# Patient Record
Sex: FEMALE | Race: WHITE
[De-identification: names, ages, dates, MRNs, and addresses within clinical notes are randomized per-mention and may not be internally consistent; named-entity substitution may affect disease eponyms.]

---

## 2021-06-23 ENCOUNTER — HOSPITAL ENCOUNTER (OUTPATIENT)
Dept: HOSPITAL 53 - M PAIN | Age: 31
End: 2021-06-23
Attending: NURSE PRACTITIONER
Payer: COMMERCIAL

## 2021-06-23 DIAGNOSIS — F17.210: ICD-10-CM

## 2021-06-23 DIAGNOSIS — M41.9: Primary | ICD-10-CM

## 2021-06-23 DIAGNOSIS — F31.9: ICD-10-CM

## 2021-06-23 DIAGNOSIS — Z88.1: ICD-10-CM

## 2021-06-23 DIAGNOSIS — M51.24: ICD-10-CM

## 2021-06-23 DIAGNOSIS — F90.9: ICD-10-CM

## 2021-08-12 ENCOUNTER — HOSPITAL ENCOUNTER (OUTPATIENT)
Dept: HOSPITAL 53 - M PAIN | Age: 31
End: 2021-08-12
Attending: ANESTHESIOLOGY
Payer: COMMERCIAL

## 2021-08-12 DIAGNOSIS — G89.29: Primary | ICD-10-CM

## 2021-08-12 DIAGNOSIS — M41.9: ICD-10-CM

## 2021-08-12 DIAGNOSIS — Z88.8: ICD-10-CM

## 2021-08-12 DIAGNOSIS — M51.24: ICD-10-CM

## 2021-08-12 DIAGNOSIS — Z88.1: ICD-10-CM

## 2021-08-12 DIAGNOSIS — F31.9: ICD-10-CM

## 2021-08-12 DIAGNOSIS — F17.210: ICD-10-CM

## 2021-09-28 ENCOUNTER — HOSPITAL ENCOUNTER (OUTPATIENT)
Dept: HOSPITAL 53 - M PAIN | Age: 31
End: 2021-09-28
Attending: ANESTHESIOLOGY
Payer: COMMERCIAL

## 2021-09-28 DIAGNOSIS — F31.9: ICD-10-CM

## 2021-09-28 DIAGNOSIS — Z88.1: ICD-10-CM

## 2021-09-28 DIAGNOSIS — M41.9: Primary | ICD-10-CM

## 2021-09-28 DIAGNOSIS — M51.24: ICD-10-CM

## 2021-09-28 DIAGNOSIS — F17.210: ICD-10-CM

## 2021-09-28 DIAGNOSIS — F90.9: ICD-10-CM

## 2021-12-07 ENCOUNTER — HOSPITAL ENCOUNTER (OUTPATIENT)
Dept: HOSPITAL 53 - M PAIN | Age: 31
End: 2021-12-07
Attending: ANESTHESIOLOGY
Payer: COMMERCIAL

## 2021-12-07 DIAGNOSIS — Z88.8: ICD-10-CM

## 2021-12-07 DIAGNOSIS — F31.9: ICD-10-CM

## 2021-12-07 DIAGNOSIS — Z51.81: Primary | ICD-10-CM

## 2021-12-07 DIAGNOSIS — Z88.1: ICD-10-CM

## 2021-12-07 DIAGNOSIS — F90.9: ICD-10-CM

## 2021-12-07 DIAGNOSIS — M41.9: ICD-10-CM

## 2021-12-07 DIAGNOSIS — F17.210: ICD-10-CM

## 2021-12-07 DIAGNOSIS — Z79.891: ICD-10-CM

## 2021-12-07 DIAGNOSIS — M51.24: ICD-10-CM
